# Patient Record
Sex: MALE | Race: OTHER | Employment: STUDENT | URBAN - NONMETROPOLITAN AREA
[De-identification: names, ages, dates, MRNs, and addresses within clinical notes are randomized per-mention and may not be internally consistent; named-entity substitution may affect disease eponyms.]

---
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
## 2021-09-08 ENCOUNTER — HOSPITAL ENCOUNTER (EMERGENCY)
Age: 21
Discharge: HOME | End: 2021-09-08

## 2021-09-08 ENCOUNTER — APPOINTMENT (OUTPATIENT)
Dept: GENERAL RADIOLOGY | Age: 21
End: 2021-09-08

## 2021-09-08 VITALS
RESPIRATION RATE: 16 BRPM | DIASTOLIC BLOOD PRESSURE: 69 MMHG | HEIGHT: 74 IN | SYSTOLIC BLOOD PRESSURE: 132 MMHG | HEART RATE: 53 BPM | TEMPERATURE: 98.9 F | OXYGEN SATURATION: 98 %

## 2021-09-08 DIAGNOSIS — R07.9 CHEST PAIN, UNSPECIFIED TYPE: Primary | ICD-10-CM

## 2021-09-08 LAB
ANION GAP SERPL CALCULATED.3IONS-SCNC: 8 MEQ/L (ref 8–16)
BASOPHILS # BLD: 0.5 %
BASOPHILS ABSOLUTE: 0 THOU/MM3 (ref 0–0.1)
BUN BLDV-MCNC: 11 MG/DL (ref 7–22)
CALCIUM SERPL-MCNC: 9.5 MG/DL (ref 8.5–10.5)
CHLORIDE BLD-SCNC: 107 MEQ/L (ref 98–111)
CO2: 25 MEQ/L (ref 23–33)
CREAT SERPL-MCNC: 0.7 MG/DL (ref 0.4–1.2)
EOSINOPHIL # BLD: 2.9 %
EOSINOPHILS ABSOLUTE: 0.2 THOU/MM3 (ref 0–0.4)
ERYTHROCYTE [DISTWIDTH] IN BLOOD BY AUTOMATED COUNT: 13 % (ref 11.5–14.5)
ERYTHROCYTE [DISTWIDTH] IN BLOOD BY AUTOMATED COUNT: 44 FL (ref 35–45)
GFR SERPL CREATININE-BSD FRML MDRD: > 90 ML/MIN/1.73M2
GLUCOSE BLD-MCNC: 84 MG/DL (ref 70–108)
HCT VFR BLD CALC: 39.4 % (ref 42–52)
HEMOGLOBIN: 12.8 GM/DL (ref 14–18)
IMMATURE GRANS (ABS): 0.02 THOU/MM3 (ref 0–0.07)
IMMATURE GRANULOCYTES: 0.2 %
LYMPHOCYTES # BLD: 28.1 %
LYMPHOCYTES ABSOLUTE: 2.4 THOU/MM3 (ref 1–4.8)
MCH RBC QN AUTO: 30.1 PG (ref 26–33)
MCHC RBC AUTO-ENTMCNC: 32.5 GM/DL (ref 32.2–35.5)
MCV RBC AUTO: 92.7 FL (ref 80–94)
MONOCYTES # BLD: 5.3 %
MONOCYTES ABSOLUTE: 0.5 THOU/MM3 (ref 0.4–1.3)
NUCLEATED RED BLOOD CELLS: 0 /100 WBC
OSMOLALITY CALCULATION: 278 MOSMOL/KG (ref 275–300)
PLATELET # BLD: 366 THOU/MM3 (ref 130–400)
PMV BLD AUTO: 10.3 FL (ref 9.4–12.4)
POTASSIUM SERPL-SCNC: 4.9 MEQ/L (ref 3.5–5.2)
RBC # BLD: 4.25 MILL/MM3 (ref 4.7–6.1)
SEG NEUTROPHILS: 63 %
SEGMENTED NEUTROPHILS ABSOLUTE COUNT: 5.4 THOU/MM3 (ref 1.8–7.7)
SODIUM BLD-SCNC: 140 MEQ/L (ref 135–145)
TROPONIN T: < 0.01 NG/ML
WBC # BLD: 8.6 THOU/MM3 (ref 4.8–10.8)

## 2021-09-08 PROCEDURE — 84484 ASSAY OF TROPONIN QUANT: CPT

## 2021-09-08 PROCEDURE — 99282 EMERGENCY DEPT VISIT SF MDM: CPT

## 2021-09-08 PROCEDURE — 71046 X-RAY EXAM CHEST 2 VIEWS: CPT

## 2021-09-08 PROCEDURE — 36415 COLL VENOUS BLD VENIPUNCTURE: CPT

## 2021-09-08 PROCEDURE — 80048 BASIC METABOLIC PNL TOTAL CA: CPT

## 2021-09-08 PROCEDURE — 93005 ELECTROCARDIOGRAM TRACING: CPT | Performed by: EMERGENCY MEDICINE

## 2021-09-08 PROCEDURE — 85025 COMPLETE CBC W/AUTO DIFF WBC: CPT

## 2021-09-08 RX ORDER — FAMOTIDINE 20 MG/1
20 TABLET, FILM COATED ORAL 2 TIMES DAILY
Qty: 20 TABLET | Refills: 0 | Status: SHIPPED | OUTPATIENT
Start: 2021-09-08

## 2021-09-08 RX ORDER — NAPROXEN 500 MG/1
500 TABLET ORAL 2 TIMES DAILY
Qty: 20 TABLET | Refills: 0 | Status: SHIPPED | OUTPATIENT
Start: 2021-09-08

## 2021-09-09 LAB
EKG ATRIAL RATE: 63 BPM
EKG P AXIS: 76 DEGREES
EKG P-R INTERVAL: 138 MS
EKG Q-T INTERVAL: 360 MS
EKG QRS DURATION: 88 MS
EKG QTC CALCULATION (BAZETT): 368 MS
EKG R AXIS: 64 DEGREES
EKG T AXIS: 52 DEGREES
EKG VENTRICULAR RATE: 63 BPM

## 2021-09-09 NOTE — ED PROVIDER NOTES
Reason for Visit: Chest Pain      HISTORY OF PRESENT ILLNESS       HPI: This 22 y.o. malepresents to the emergency department for intermittent chest pain over last 3 days. Patient one episode today one episode the day before and to have a the day before that. Patient denies shortness of breath nausea vomiting. Patient with chest pain lasting for few seconds then resolves. No back pain headache neck pain. No abdominal pain. No history of same. No other complaints. Past Medical History:   Diagnosis Date    Asthma        No past surgical history on file. [address]   Social History     Socioeconomic History    Marital status: Single     Spouse name: Not on file    Number of children: Not on file    Years of education: Not on file    Highest education level: Not on file   Occupational History    Not on file   Tobacco Use    Smoking status: Never Smoker    Smokeless tobacco: Never Used   Substance and Sexual Activity    Alcohol use: No    Drug use: No    Sexual activity: Not on file   Other Topics Concern    Not on file   Social History Narrative    Not on file     Social Determinants of Health     Financial Resource Strain:     Difficulty of Paying Living Expenses:    Food Insecurity:     Worried About Running Out of Food in the Last Year:     [address] in the Last Year:    Transportation Needs:     Lack of Transportation (Medical):      Lack of Transportation (Non-Medical):    Physical Activity:     Days of Exercise per Week:     Minutes of Exercise per Session:    Stress:     Feeling of Stress :    Social Connections:     Frequency of Communication with Friends and Family:     Frequency of Social Gatherings with Friends and Family:     Attends Jainism Services:     Active Member of Clubs or Organizations:     Attends Club or Organization Meetings:     Marital Status:    Intimate Partner Violence:     Fear of Current or Ex-Partner:     Emotionally Abused:     Physically Abused:     Sexually Abused:        Previous Medications    ALBUTEROL (ACCUNEB) 0.63 MG/3ML NEBULIZER SOLUTION    Take 1 ampule by nebulization every 6 hours as needed for Wheezing. FLUTICASONE (VERAMYST) 27.5 MCG/SPRAY NASAL SPRAY    2 sprays by Nasal route daily       Allergies: Allergies as of 09/08/2021    (No Known Allergies)       Review of Systems       See HPI for further details. At least 10 systems reviewed and are otherwise negative unless noted in the history of present illness. Constitutional: Denies fever or chills   Eyes: Denies change in visual acuity   HENT: Denies nasal congestion or sore throat   Respiratory: Denies cough or shortness of breath   Cardiovascular: admits chest pain denies edema   GI: Denies abdominal pain, nausea, vomiting, bloody stools or diarrhea   : Denies dysuria   Musculoskeletal: Denies back pain or joint pain   Integument: Denies rash   Neurologic: Denies headache, focal weakness or sensory changes   Endocrine: Denies polyuria or polydipsia   Lymphatic: Denies swollen glands   Psychiatric: Denies depression or anxiety       Physical Exam       Vitals:    09/08/21 1905 09/08/21 1906   BP:  132/69   Pulse:  53   Resp:  16   Temp:  98.9 °F (37.2 °C)   TempSrc:  Oral   SpO2:  98%   Height: 6' 2\" (1.88 m)        Constitutional: No acute distress, Non-toxic appearance; well nourished    HENT: Normocephalic, Atraumatic, Bilateral external ears normal, Oropharynx moist, No oral exudates, Nose normal.    Eyes: PERRLA, EOMI, Conjunctiva normal, No discharge. Neck: Normal range of motion, No tenderness, Supple, No lymphadenopathy, No stridor. Cardiovascular: Normal heart rate, Normal rhythm, No murmurs, No rubs, No gallops. Pulmonary/Chest: Normal breath sounds, No respiratory distress, No wheezing,     Abdomen:  Bowel sounds normal, Soft, No tenderness, No masses, No pulsatile masses    Back: No tenderness, No CVA tenderness    Extremities: Normal range of motion, Intact distal pulses, No edema, No tenderness     Neurologic: Alert & oriented x 3; Normal motor function, Normal sensory function, No focal defecits    Skin: Warm, Dry, No erythema, No rash    Psychiatric: Alert and oriented to person, place, and time. Patient maintains good eye contact. Mood and affect were normal. Concentration appeared normal      Diagnostic Studies       Please see electronic medical record for any tests performed in the ED. Labs:    Labs Reviewed   CBC WITH AUTO DIFFERENTIAL - Abnormal; Notable for the following components:       Result Value    RBC 4.25 (*)     Hemoglobin 12.8 (*)     Hematocrit 39.4 (*)     All other components within normal limits   BASIC METABOLIC PANEL   TROPONIN       Radiology:    XR CHEST (2 VW)   Final Result   Normal chest series. **This report has been created using voice recognition software. It may contain minor errors which are inherent in voice recognition technology. **      Final report electronically signed by Dr. Desean Carpenter MD on 9/8/2021 7:28 PM          EKG:  Normal sinus rhythm  Vent. rate 63 BPM OK interval 138 ms QRS duration 88 ms QT/QTc 360/368 ms P-R-T axes 76 64 52    Improved from previous. Emergency Department Procedures         ED Course and MDM       Susan Del Cid is a 22 y.o. male who presented to the emergency department with a chief complaint of chest pain  Patient was seen, history and physical exam was performed. Patient remains stable here in the emergency department. Patient's laboratory values, EKG, imaging studies and physical examination findings were reviewed and were reassuring.     Labs Reviewed   CBC WITH AUTO DIFFERENTIAL - Abnormal; Notable for the following components:       Result Value    RBC 4.25 (*)     Hemoglobin 12.8 (*)     Hematocrit 39.4 (*)     All other components within normal limits   BASIC METABOLIC PANEL   TROPONIN     Medications - No data to display  New Prescriptions FAMOTIDINE (PEPCID) 20 MG TABLET    Take 1 tablet by mouth 2 times daily    NAPROXEN (NAPROSYN) 500 MG TABLET    Take 1 tablet by mouth 2 times daily         Counseling     The emergency provider has spoken with the patient and discussed today's findings, in addition to providing specific details for the plan of care. Questions are answered and there is agreement with the plan. Patient was given clear discharge and followup instructions including return to the ER immediately for worsening concerns. Patient is advised to followup with primary care physician and/or referred physician in the next one to two days or sooner if worsening and to return to the ER immediately as above with any concerns. Usual and customary treatment and medication side effects and warning signs discussed. Patient was discharged from emergency department in good condition with all questions answered and patient has demonstrated capacity to understand these instructions and to follow up. Refer to the patient's discharge summary for more information on plan and follow-up. I have explained to the patient in appropriate terminology our work-up in the ED and their diagnosis. I have also given anticipatory guidance and expectant management of their condition as an outpatient as per my custom. They are instructed to return to the ED if their condition deteriorates in any way    This patient was seen under the direct supervision of the attending physician who was available for consultation. Differential Diagnosis   Chest wall pain versus arrhythmia versus reflux    Consults: None     DECISION to ADMIT / DISCHARGE:     8:19 PM    Clinical Impression       1.   1. Chest pain, unspecified type        Disposition       All results were shared with the patient, medical decision making was discussed and all questions were answered, and he agreed to assessment and plan.     Patient was DISCHARGED from the hospital. Based on the reassuring ED workup and patient's stable vital signs, I feel the patient may be safely discharged home. At this point in time, I believe the patient has the mental capacity to make medical decisions.         Hue Heinma  09/08/21 3413